# Patient Record
Sex: FEMALE | Race: WHITE | NOT HISPANIC OR LATINO | Employment: OTHER | URBAN - METROPOLITAN AREA
[De-identification: names, ages, dates, MRNs, and addresses within clinical notes are randomized per-mention and may not be internally consistent; named-entity substitution may affect disease eponyms.]

---

## 2021-03-31 PROBLEM — G62.9 NEUROPATHY: Status: ACTIVE | Noted: 2021-03-31

## 2021-03-31 PROBLEM — C50.912: Status: ACTIVE | Noted: 2021-03-31

## 2021-03-31 PROBLEM — K21.9 GERD (GASTROESOPHAGEAL REFLUX DISEASE): Status: ACTIVE | Noted: 2021-03-31

## 2021-03-31 PROBLEM — E27.1 ADDISON'S DISEASE: Status: ACTIVE | Noted: 2021-03-31

## 2021-04-19 PROBLEM — M81.0 OSTEOPOROSIS: Status: ACTIVE | Noted: 2021-04-19

## 2021-04-19 PROBLEM — Z17.0 MALIGNANT NEOPLASM OF LEFT BREAST IN FEMALE, ESTROGEN RECEPTOR POSITIVE: Status: ACTIVE | Noted: 2021-03-31

## 2021-08-19 PROBLEM — C50.012 MALIGNANT NEOPLASM INVOLVING BOTH NIPPLE AND AREOLA OF LEFT BREAST IN FEMALE, ESTROGEN RECEPTOR POSITIVE: Status: ACTIVE | Noted: 2021-03-31

## 2021-09-27 PROBLEM — H53.8 BLURRING OF VISION: Status: ACTIVE | Noted: 2021-09-27

## 2021-09-27 PROBLEM — K61.0 ANAL ABSCESS: Status: ACTIVE | Noted: 2021-09-27

## 2021-09-27 PROBLEM — Z51.11 ENCOUNTER FOR ANTINEOPLASTIC CHEMOTHERAPY: Status: ACTIVE | Noted: 2021-09-27

## 2021-09-29 PROBLEM — K60.3 FISTULA-IN-ANO: Status: ACTIVE | Noted: 2021-09-29

## 2021-09-29 PROBLEM — K62.9 ANAL LESION: Status: ACTIVE | Noted: 2021-09-29

## 2021-10-13 PROBLEM — H25.811 COMBINED FORMS OF AGE-RELATED CATARACT OF RIGHT EYE: Status: ACTIVE | Noted: 2021-10-13

## 2021-10-19 PROBLEM — K61.2 ANORECTAL ABSCESS: Status: ACTIVE | Noted: 2021-10-19

## 2022-01-03 PROBLEM — Z09 CHEMOTHERAPY FOLLOW-UP EXAMINATION: Status: ACTIVE | Noted: 2022-01-03

## 2022-04-04 PROBLEM — K52.9 COLITIS: Status: ACTIVE | Noted: 2022-04-04

## 2022-04-04 PROBLEM — I10 PRIMARY HYPERTENSION: Status: ACTIVE | Noted: 2022-04-04

## 2022-04-04 PROBLEM — Z09 CHEMOTHERAPY FOLLOW-UP EXAMINATION: Status: RESOLVED | Noted: 2022-01-03 | Resolved: 2022-04-04

## 2022-07-20 PROBLEM — G47.00 INSOMNIA: Status: ACTIVE | Noted: 2022-07-20

## 2022-08-03 PROBLEM — Z63.4 DEATH OF RELATIVE: Status: ACTIVE | Noted: 2022-08-03

## 2022-08-03 PROBLEM — Z79.810 ENCOUNTER FOR MONITORING TAMOXIFEN THERAPY: Status: ACTIVE | Noted: 2022-08-03

## 2022-08-03 PROBLEM — F43.21 GRIEVING: Status: ACTIVE | Noted: 2022-08-03

## 2022-08-03 PROBLEM — Z51.81 ENCOUNTER FOR MONITORING TAMOXIFEN THERAPY: Status: ACTIVE | Noted: 2022-08-03

## 2022-09-20 PROBLEM — Z95.828 PORT-A-CATH IN PLACE: Status: ACTIVE | Noted: 2022-09-20

## 2023-03-29 PROBLEM — F33.1 MAJOR DEPRESSIVE DISORDER, RECURRENT EPISODE, MODERATE WITH ANXIOUS DISTRESS: Status: ACTIVE | Noted: 2023-03-29

## 2023-04-19 ENCOUNTER — SOCIAL WORK (OUTPATIENT)
Dept: ADMINISTRATIVE | Facility: OTHER | Age: 63
End: 2023-04-19

## 2023-04-19 NOTE — PROGRESS NOTES
Sw received a referral to assist with grief counseling. [facility] had received a consult to see Patient. However, the Clinic does not have a counselor on staff. Sw mailed Patient a list of counselors located in the [facility]. She was encouraged to contact one to schedule an assessment if she was still in need of services.    Michelle Ashton LCSW    [phone]

## 2023-06-22 ENCOUNTER — PATIENT MESSAGE (OUTPATIENT)
Dept: ADMINISTRATIVE | Facility: OTHER | Age: 63
End: 2023-06-22

## 2023-09-02 ENCOUNTER — PATIENT MESSAGE (OUTPATIENT)
Dept: ADMINISTRATIVE | Facility: OTHER | Age: 63
End: 2023-09-02

## 2024-03-25 PROBLEM — K59.00 CONSTIPATION: Status: ACTIVE | Noted: 2024-03-25

## 2024-03-25 PROBLEM — R10.9 GASTRIC PAIN: Status: ACTIVE | Noted: 2024-03-25

## 2024-03-25 PROBLEM — Z08 ENCOUNTER FOR FOLLOW-UP SURVEILLANCE OF BREAST CANCER: Status: ACTIVE | Noted: 2024-03-25

## 2024-03-25 PROBLEM — Z85.3 ENCOUNTER FOR FOLLOW-UP SURVEILLANCE OF BREAST CANCER: Status: ACTIVE | Noted: 2024-03-25

## 2024-03-25 PROBLEM — Z12.31 BREAST CANCER SCREENING BY MAMMOGRAM: Status: ACTIVE | Noted: 2022-08-03
